# Patient Record
Sex: MALE | Race: WHITE | URBAN - METROPOLITAN AREA
[De-identification: names, ages, dates, MRNs, and addresses within clinical notes are randomized per-mention and may not be internally consistent; named-entity substitution may affect disease eponyms.]

---

## 2020-06-01 ENCOUNTER — TELEPHONE ENCOUNTER (OUTPATIENT)
Dept: URBAN - METROPOLITAN AREA CLINIC 128 | Facility: CLINIC | Age: 65
End: 2020-06-01

## 2020-06-10 ENCOUNTER — OFFICE VISIT (OUTPATIENT)
Dept: URBAN - METROPOLITAN AREA CLINIC 19 | Facility: CLINIC | Age: 65
End: 2020-06-10

## 2020-06-10 NOTE — HPI-TODAY'S VISIT:
Patient seen today via telehealth by agreement and consent of patient in light of current COVID-19 pandemic. I used a telephone call during the visit. The patient encounter is appropriate and reasonable under the circumstances given the patient's particular presentation at this time. The patient has been advised of the followin) the potential risks and limitations of this mode of treatment (including but not limited to the absence of in-person examination); 2) the right to refuse telehealth services at any point without affecting the right to future care; 3) the right to receive in-person services, included immediately after this consultation if an urgent need arises; 4) information, including identifiable images or information from this telehealth consult, will only be shared in accordance with HIPAA regulations. Any and all of the patient's and/or patient's family member's questions on this issue have been answered. The patient has verbally consented to be treated via telehealth services. The patient has also been advised to contact this office for worsening conditions or problems, and seek emergency medical treatment and/or call 911 if the patient deems either necessary.

## 2020-06-11 ENCOUNTER — OFFICE VISIT (OUTPATIENT)
Dept: URBAN - METROPOLITAN AREA CLINIC 19 | Facility: CLINIC | Age: 65
End: 2020-06-11
Payer: COMMERCIAL

## 2020-06-11 DIAGNOSIS — R14.3 GAS AND BLOATING: ICD-10-CM

## 2020-06-11 DIAGNOSIS — R19.7 DIARRHEA: ICD-10-CM

## 2020-06-11 PROBLEM — 271832001 FLATULENCE, ERUCTATION AND GAS PAIN: Status: ACTIVE | Noted: 2020-06-11

## 2020-06-11 PROCEDURE — 99442 PHONE E/M BY PHYS 11-20 MIN: CPT | Performed by: INTERNAL MEDICINE

## 2020-06-11 NOTE — HPI-TODAY'S VISIT:
Mr. Moraes is  a 64 year old male who was last seen in GI clinic on 5/15/2020. He reports having excessive gas/bleching and diarrhea.   He reports being unable to identify any food triggers that are associated with his diet. He reports gas/belching and belching have not significantly changed from prior visit.  He reports having approximately 4 sodas a day.   On 2020 we performed and EGD and colonoscopy. 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 The colonoscopy showed a 4 mm cecal polyp, 10 mm ascending colon polyp, and 12 mm transverse colon polyp, and scattered diverticulosis.   Esophageal biopsies were negative for esophilic esophagitis, gastric biopsies were negative for H. pylori, and duodenal biopsies were negative for Celiac disease. Colon polyps were tubular adenomas and repeat colonoscopy was recommended in 3 years.   Prior history is summarized below: -Mr. Moraes reports for the since around 2020 having abdominal pain, increased flatulence and belching, diarrhea and abdominal bloating. He reports his stool baseline is a BM every 2-3 days but currently has a BM 2-3 times a day. He reports no blood in stool.  -He reports no changes in diet. He reports having 2 glasses of milk a week. He reports having breads at least once a day.   -He reports his mother had gastric cancer in her 50s.  -He reports having PVD and reports being on plavix and ASA 81 mg. He reports resuming smoking.  -His EGD from  was reviewed and showed moderate sized hiatal hernia.   Patient seen today via telehealth by agreement and consent of patient in light of current COVID-19 pandemic. I used a telephone call during the visit. The patient encounter is appropriate and reasonable under the circumstances given the patient's particular presentation at this time. The patient has been advised of the followin) the potential risks and limitations of this mode of treatment (including but not limited to the absence of in-person examination); 2) the right to refuse telehealth services at any point without affecting the right to future care; 3) the right to receive in-person services, included immediately after this consultation if an urgent need arises; 4) information, including identifiable images or information from this telehealth consult, will only be shared in accordance with HIPAA regulations. Any and all of the patient's and/or patient's family member's questions on this issue have been answered. The patient has verbally consented to be treated via telehealth services. The patient has also been advised to contact this office for worsening conditions or problems, and seek emergency medical treatment and/or call 911 if the patient deems either necessary.